# Patient Record
Sex: FEMALE | Race: OTHER | URBAN - METROPOLITAN AREA
[De-identification: names, ages, dates, MRNs, and addresses within clinical notes are randomized per-mention and may not be internally consistent; named-entity substitution may affect disease eponyms.]

---

## 2017-12-16 ENCOUNTER — INPATIENT (INPATIENT)
Facility: HOSPITAL | Age: 52
LOS: 0 days | Discharge: HOME | DRG: 419 | End: 2017-12-17
Attending: EMERGENCY MEDICINE | Admitting: EMERGENCY MEDICINE
Payer: COMMERCIAL

## 2017-12-16 ENCOUNTER — RESULT REVIEW (OUTPATIENT)
Age: 52
End: 2017-12-16

## 2017-12-16 ENCOUNTER — TRANSCRIPTION ENCOUNTER (OUTPATIENT)
Age: 52
End: 2017-12-16

## 2017-12-16 VITALS
HEART RATE: 85 BPM | RESPIRATION RATE: 20 BRPM | OXYGEN SATURATION: 99 % | TEMPERATURE: 98 F | DIASTOLIC BLOOD PRESSURE: 104 MMHG | SYSTOLIC BLOOD PRESSURE: 171 MMHG

## 2017-12-16 DIAGNOSIS — R10.9 UNSPECIFIED ABDOMINAL PAIN: ICD-10-CM

## 2017-12-16 DIAGNOSIS — Z98.891 HISTORY OF UTERINE SCAR FROM PREVIOUS SURGERY: Chronic | ICD-10-CM

## 2017-12-16 LAB
ALBUMIN SERPL ELPH-MCNC: 4.1 G/DL — SIGNIFICANT CHANGE UP (ref 3.3–5)
ALBUMIN SERPL ELPH-MCNC: 4.5 G/DL — SIGNIFICANT CHANGE UP (ref 3.3–5)
ALP SERPL-CCNC: 70 U/L — SIGNIFICANT CHANGE UP (ref 40–120)
ALP SERPL-CCNC: 75 U/L — SIGNIFICANT CHANGE UP (ref 40–120)
ALT FLD-CCNC: 81 U/L RC — HIGH (ref 10–45)
ALT FLD-CCNC: 94 U/L RC — HIGH (ref 10–45)
ANION GAP SERPL CALC-SCNC: 11 MMOL/L — SIGNIFICANT CHANGE UP (ref 5–17)
ANION GAP SERPL CALC-SCNC: 14 MMOL/L — SIGNIFICANT CHANGE UP (ref 5–17)
ANION GAP SERPL CALC-SCNC: 15 MMOL/L — SIGNIFICANT CHANGE UP (ref 5–17)
ANION GAP SERPL CALC-SCNC: 15 MMOL/L — SIGNIFICANT CHANGE UP (ref 5–17)
APPEARANCE UR: ABNORMAL
APTT BLD: 35.1 SEC — SIGNIFICANT CHANGE UP (ref 27.5–37.4)
AST SERPL-CCNC: 59 U/L — HIGH (ref 10–40)
AST SERPL-CCNC: 67 U/L — HIGH (ref 10–40)
BASOPHILS # BLD AUTO: 0.1 K/UL — SIGNIFICANT CHANGE UP (ref 0–0.2)
BASOPHILS NFR BLD AUTO: 1 % — SIGNIFICANT CHANGE UP (ref 0–2)
BILIRUB SERPL-MCNC: 0.3 MG/DL — SIGNIFICANT CHANGE UP (ref 0.2–1.2)
BILIRUB SERPL-MCNC: 0.4 MG/DL — SIGNIFICANT CHANGE UP (ref 0.2–1.2)
BILIRUB UR-MCNC: NEGATIVE — SIGNIFICANT CHANGE UP
BLD GP AB SCN SERPL QL: NEGATIVE — SIGNIFICANT CHANGE UP
BUN SERPL-MCNC: 10 MG/DL — SIGNIFICANT CHANGE UP (ref 7–23)
BUN SERPL-MCNC: 13 MG/DL — SIGNIFICANT CHANGE UP (ref 7–23)
BUN SERPL-MCNC: 9 MG/DL — SIGNIFICANT CHANGE UP (ref 7–23)
BUN SERPL-MCNC: 9 MG/DL — SIGNIFICANT CHANGE UP (ref 7–23)
CALCIUM SERPL-MCNC: 8 MG/DL — LOW (ref 8.4–10.5)
CALCIUM SERPL-MCNC: 8.2 MG/DL — LOW (ref 8.4–10.5)
CALCIUM SERPL-MCNC: 8.4 MG/DL — SIGNIFICANT CHANGE UP (ref 8.4–10.5)
CALCIUM SERPL-MCNC: 9 MG/DL — SIGNIFICANT CHANGE UP (ref 8.4–10.5)
CHLORIDE SERPL-SCNC: 101 MMOL/L — SIGNIFICANT CHANGE UP (ref 96–108)
CHLORIDE SERPL-SCNC: 101 MMOL/L — SIGNIFICANT CHANGE UP (ref 96–108)
CHLORIDE SERPL-SCNC: 103 MMOL/L — SIGNIFICANT CHANGE UP (ref 96–108)
CHLORIDE SERPL-SCNC: 103 MMOL/L — SIGNIFICANT CHANGE UP (ref 96–108)
CO2 SERPL-SCNC: 20 MMOL/L — LOW (ref 22–31)
CO2 SERPL-SCNC: 23 MMOL/L — SIGNIFICANT CHANGE UP (ref 22–31)
CO2 SERPL-SCNC: 24 MMOL/L — SIGNIFICANT CHANGE UP (ref 22–31)
CO2 SERPL-SCNC: 24 MMOL/L — SIGNIFICANT CHANGE UP (ref 22–31)
COLOR SPEC: SIGNIFICANT CHANGE UP
CREAT SERPL-MCNC: 0.43 MG/DL — LOW (ref 0.5–1.3)
CREAT SERPL-MCNC: 0.43 MG/DL — LOW (ref 0.5–1.3)
CREAT SERPL-MCNC: 0.44 MG/DL — LOW (ref 0.5–1.3)
CREAT SERPL-MCNC: 0.47 MG/DL — LOW (ref 0.5–1.3)
DIFF PNL FLD: NEGATIVE — SIGNIFICANT CHANGE UP
EOSINOPHIL # BLD AUTO: 0 K/UL — SIGNIFICANT CHANGE UP (ref 0–0.5)
EOSINOPHIL NFR BLD AUTO: 0.3 % — SIGNIFICANT CHANGE UP (ref 0–6)
GAS PNL BLDV: SIGNIFICANT CHANGE UP
GLUCOSE SERPL-MCNC: 103 MG/DL — HIGH (ref 70–99)
GLUCOSE SERPL-MCNC: 105 MG/DL — HIGH (ref 70–99)
GLUCOSE SERPL-MCNC: 134 MG/DL — HIGH (ref 70–99)
GLUCOSE SERPL-MCNC: 144 MG/DL — HIGH (ref 70–99)
GLUCOSE UR QL: NEGATIVE — SIGNIFICANT CHANGE UP
HCG SERPL-ACNC: <2 MIU/ML — LOW (ref 5–24)
HCT VFR BLD CALC: 34.1 % — LOW (ref 34.5–45)
HCT VFR BLD CALC: 37.7 % — SIGNIFICANT CHANGE UP (ref 34.5–45)
HGB BLD-MCNC: 11.6 G/DL — SIGNIFICANT CHANGE UP (ref 11.5–15.5)
HGB BLD-MCNC: 12.7 G/DL — SIGNIFICANT CHANGE UP (ref 11.5–15.5)
INR BLD: 1.08 RATIO — SIGNIFICANT CHANGE UP (ref 0.88–1.16)
KETONES UR-MCNC: NEGATIVE — SIGNIFICANT CHANGE UP
LEUKOCYTE ESTERASE UR-ACNC: ABNORMAL
LIDOCAIN IGE QN: 21 U/L — SIGNIFICANT CHANGE UP (ref 7–60)
LYMPHOCYTES # BLD AUTO: 1.7 K/UL — SIGNIFICANT CHANGE UP (ref 1–3.3)
LYMPHOCYTES # BLD AUTO: 22.3 % — SIGNIFICANT CHANGE UP (ref 13–44)
MCHC RBC-ENTMCNC: 32.2 PG — SIGNIFICANT CHANGE UP (ref 27–34)
MCHC RBC-ENTMCNC: 32.8 PG — SIGNIFICANT CHANGE UP (ref 27–34)
MCHC RBC-ENTMCNC: 33.7 GM/DL — SIGNIFICANT CHANGE UP (ref 32–36)
MCHC RBC-ENTMCNC: 34.2 GM/DL — SIGNIFICANT CHANGE UP (ref 32–36)
MCV RBC AUTO: 95.6 FL — SIGNIFICANT CHANGE UP (ref 80–100)
MCV RBC AUTO: 96.1 FL — SIGNIFICANT CHANGE UP (ref 80–100)
MONOCYTES # BLD AUTO: 0.4 K/UL — SIGNIFICANT CHANGE UP (ref 0–0.9)
MONOCYTES NFR BLD AUTO: 5.8 % — SIGNIFICANT CHANGE UP (ref 2–14)
NEUTROPHILS # BLD AUTO: 5.3 K/UL — SIGNIFICANT CHANGE UP (ref 1.8–7.4)
NEUTROPHILS NFR BLD AUTO: 70.6 % — SIGNIFICANT CHANGE UP (ref 43–77)
NITRITE UR-MCNC: NEGATIVE — SIGNIFICANT CHANGE UP
PH UR: 7 — SIGNIFICANT CHANGE UP (ref 5–8)
PLATELET # BLD AUTO: 157 K/UL — SIGNIFICANT CHANGE UP (ref 150–400)
PLATELET # BLD AUTO: 188 K/UL — SIGNIFICANT CHANGE UP (ref 150–400)
POTASSIUM SERPL-MCNC: 3.2 MMOL/L — LOW (ref 3.5–5.3)
POTASSIUM SERPL-MCNC: 3.4 MMOL/L — LOW (ref 3.5–5.3)
POTASSIUM SERPL-SCNC: 3.2 MMOL/L — LOW (ref 3.5–5.3)
POTASSIUM SERPL-SCNC: 3.4 MMOL/L — LOW (ref 3.5–5.3)
PROT SERPL-MCNC: 7.7 G/DL — SIGNIFICANT CHANGE UP (ref 6–8.3)
PROT SERPL-MCNC: 8.6 G/DL — HIGH (ref 6–8.3)
PROT UR-MCNC: 30 MG/DL
PROTHROM AB SERPL-ACNC: 11.8 SEC — SIGNIFICANT CHANGE UP (ref 9.8–12.7)
RBC # BLD: 3.55 M/UL — LOW (ref 3.8–5.2)
RBC # BLD: 3.94 M/UL — SIGNIFICANT CHANGE UP (ref 3.8–5.2)
RBC # FLD: 11.3 % — SIGNIFICANT CHANGE UP (ref 10.3–14.5)
RBC # FLD: 11.4 % — SIGNIFICANT CHANGE UP (ref 10.3–14.5)
RH IG SCN BLD-IMP: POSITIVE — SIGNIFICANT CHANGE UP
SODIUM SERPL-SCNC: 136 MMOL/L — SIGNIFICANT CHANGE UP (ref 135–145)
SODIUM SERPL-SCNC: 138 MMOL/L — SIGNIFICANT CHANGE UP (ref 135–145)
SODIUM SERPL-SCNC: 139 MMOL/L — SIGNIFICANT CHANGE UP (ref 135–145)
SODIUM SERPL-SCNC: 141 MMOL/L — SIGNIFICANT CHANGE UP (ref 135–145)
SP GR SPEC: 1.02 — SIGNIFICANT CHANGE UP (ref 1.01–1.02)
UROBILINOGEN FLD QL: NEGATIVE — SIGNIFICANT CHANGE UP
WBC # BLD: 11.2 K/UL — HIGH (ref 3.8–10.5)
WBC # BLD: 7.5 K/UL — SIGNIFICANT CHANGE UP (ref 3.8–10.5)
WBC # FLD AUTO: 11.2 K/UL — HIGH (ref 3.8–10.5)
WBC # FLD AUTO: 7.5 K/UL — SIGNIFICANT CHANGE UP (ref 3.8–10.5)

## 2017-12-16 PROCEDURE — 99285 EMERGENCY DEPT VISIT HI MDM: CPT | Mod: 25

## 2017-12-16 PROCEDURE — 71010: CPT | Mod: 26

## 2017-12-16 PROCEDURE — 99221 1ST HOSP IP/OBS SF/LOW 40: CPT | Mod: 57

## 2017-12-16 PROCEDURE — 76705 ECHO EXAM OF ABDOMEN: CPT | Mod: 26

## 2017-12-16 PROCEDURE — 47562 LAPAROSCOPIC CHOLECYSTECTOMY: CPT

## 2017-12-16 PROCEDURE — 88304 TISSUE EXAM BY PATHOLOGIST: CPT | Mod: 26

## 2017-12-16 PROCEDURE — 74020: CPT | Mod: 26

## 2017-12-16 RX ORDER — ENOXAPARIN SODIUM 100 MG/ML
40 INJECTION SUBCUTANEOUS DAILY
Refills: 0 | Status: DISCONTINUED | OUTPATIENT
Start: 2017-12-16 | End: 2017-12-16

## 2017-12-16 RX ORDER — ONDANSETRON 8 MG/1
4 TABLET, FILM COATED ORAL ONCE
Refills: 0 | Status: DISCONTINUED | OUTPATIENT
Start: 2017-12-16 | End: 2017-12-16

## 2017-12-16 RX ORDER — ACETAMINOPHEN 500 MG
1000 TABLET ORAL ONCE
Refills: 0 | Status: COMPLETED | OUTPATIENT
Start: 2017-12-16 | End: 2017-12-16

## 2017-12-16 RX ORDER — POTASSIUM CHLORIDE 20 MEQ
40 PACKET (EA) ORAL ONCE
Refills: 0 | Status: DISCONTINUED | OUTPATIENT
Start: 2017-12-16 | End: 2017-12-16

## 2017-12-16 RX ORDER — ONDANSETRON 8 MG/1
4 TABLET, FILM COATED ORAL EVERY 8 HOURS
Refills: 0 | Status: DISCONTINUED | OUTPATIENT
Start: 2017-12-16 | End: 2017-12-16

## 2017-12-16 RX ORDER — HYDROMORPHONE HYDROCHLORIDE 2 MG/ML
1 INJECTION INTRAMUSCULAR; INTRAVENOUS; SUBCUTANEOUS EVERY 4 HOURS
Refills: 0 | Status: DISCONTINUED | OUTPATIENT
Start: 2017-12-16 | End: 2017-12-16

## 2017-12-16 RX ORDER — HYDROMORPHONE HYDROCHLORIDE 2 MG/ML
0.5 INJECTION INTRAMUSCULAR; INTRAVENOUS; SUBCUTANEOUS EVERY 4 HOURS
Refills: 0 | Status: DISCONTINUED | OUTPATIENT
Start: 2017-12-16 | End: 2017-12-16

## 2017-12-16 RX ORDER — SODIUM CHLORIDE 9 MG/ML
1000 INJECTION INTRAMUSCULAR; INTRAVENOUS; SUBCUTANEOUS
Refills: 0 | Status: DISCONTINUED | OUTPATIENT
Start: 2017-12-16 | End: 2017-12-16

## 2017-12-16 RX ORDER — OXYCODONE AND ACETAMINOPHEN 5; 325 MG/1; MG/1
1 TABLET ORAL EVERY 4 HOURS
Refills: 0 | Status: DISCONTINUED | OUTPATIENT
Start: 2017-12-16 | End: 2017-12-17

## 2017-12-16 RX ORDER — POTASSIUM CHLORIDE 20 MEQ
10 PACKET (EA) ORAL ONCE
Refills: 0 | Status: COMPLETED | OUTPATIENT
Start: 2017-12-16 | End: 2017-12-16

## 2017-12-16 RX ORDER — CEFOTETAN DISODIUM 1 G
2 VIAL (EA) INJECTION ONCE
Refills: 0 | Status: COMPLETED | OUTPATIENT
Start: 2017-12-16 | End: 2017-12-16

## 2017-12-16 RX ORDER — POTASSIUM CHLORIDE 20 MEQ
10 PACKET (EA) ORAL DAILY
Refills: 0 | Status: DISCONTINUED | OUTPATIENT
Start: 2017-12-16 | End: 2017-12-16

## 2017-12-16 RX ORDER — MORPHINE SULFATE 50 MG/1
4 CAPSULE, EXTENDED RELEASE ORAL ONCE
Refills: 0 | Status: DISCONTINUED | OUTPATIENT
Start: 2017-12-16 | End: 2017-12-16

## 2017-12-16 RX ORDER — ENOXAPARIN SODIUM 100 MG/ML
40 INJECTION SUBCUTANEOUS EVERY 24 HOURS
Refills: 0 | Status: DISCONTINUED | OUTPATIENT
Start: 2017-12-16 | End: 2017-12-17

## 2017-12-16 RX ORDER — HYDROMORPHONE HYDROCHLORIDE 2 MG/ML
0.5 INJECTION INTRAMUSCULAR; INTRAVENOUS; SUBCUTANEOUS
Refills: 0 | Status: DISCONTINUED | OUTPATIENT
Start: 2017-12-16 | End: 2017-12-16

## 2017-12-16 RX ORDER — OXYCODONE AND ACETAMINOPHEN 5; 325 MG/1; MG/1
2 TABLET ORAL EVERY 6 HOURS
Refills: 0 | Status: DISCONTINUED | OUTPATIENT
Start: 2017-12-16 | End: 2017-12-17

## 2017-12-16 RX ORDER — LOSARTAN POTASSIUM 100 MG/1
25 TABLET, FILM COATED ORAL DAILY
Refills: 0 | Status: DISCONTINUED | OUTPATIENT
Start: 2017-12-16 | End: 2017-12-17

## 2017-12-16 RX ORDER — CEFOTETAN DISODIUM 1 G
2 VIAL (EA) INJECTION EVERY 12 HOURS
Refills: 0 | Status: DISCONTINUED | OUTPATIENT
Start: 2017-12-16 | End: 2017-12-16

## 2017-12-16 RX ORDER — POTASSIUM CHLORIDE 20 MEQ
40 PACKET (EA) ORAL EVERY 4 HOURS
Refills: 0 | Status: COMPLETED | OUTPATIENT
Start: 2017-12-16 | End: 2017-12-16

## 2017-12-16 RX ORDER — ACETAMINOPHEN 500 MG
1000 TABLET ORAL ONCE
Refills: 0 | Status: DISCONTINUED | OUTPATIENT
Start: 2017-12-16 | End: 2017-12-17

## 2017-12-16 RX ORDER — SODIUM CHLORIDE 9 MG/ML
1000 INJECTION INTRAMUSCULAR; INTRAVENOUS; SUBCUTANEOUS ONCE
Refills: 0 | Status: COMPLETED | OUTPATIENT
Start: 2017-12-16 | End: 2017-12-16

## 2017-12-16 RX ORDER — LOSARTAN POTASSIUM 100 MG/1
25 TABLET, FILM COATED ORAL DAILY
Refills: 0 | Status: DISCONTINUED | OUTPATIENT
Start: 2017-12-16 | End: 2017-12-16

## 2017-12-16 RX ORDER — SODIUM CHLORIDE 9 MG/ML
1000 INJECTION, SOLUTION INTRAVENOUS
Refills: 0 | Status: DISCONTINUED | OUTPATIENT
Start: 2017-12-16 | End: 2017-12-17

## 2017-12-16 RX ADMIN — Medication 100 MILLIEQUIVALENT(S): at 21:53

## 2017-12-16 RX ADMIN — Medication 100 MILLIEQUIVALENT(S): at 23:13

## 2017-12-16 RX ADMIN — SODIUM CHLORIDE 1000 MILLILITER(S): 9 INJECTION INTRAMUSCULAR; INTRAVENOUS; SUBCUTANEOUS at 04:58

## 2017-12-16 RX ADMIN — HYDROMORPHONE HYDROCHLORIDE 0.5 MILLIGRAM(S): 2 INJECTION INTRAMUSCULAR; INTRAVENOUS; SUBCUTANEOUS at 12:32

## 2017-12-16 RX ADMIN — ONDANSETRON 4 MILLIGRAM(S): 8 TABLET, FILM COATED ORAL at 17:10

## 2017-12-16 RX ADMIN — Medication 1000 MILLIGRAM(S): at 05:50

## 2017-12-16 RX ADMIN — Medication 400 MILLIGRAM(S): at 05:20

## 2017-12-16 RX ADMIN — HYDROMORPHONE HYDROCHLORIDE 0.5 MILLIGRAM(S): 2 INJECTION INTRAMUSCULAR; INTRAVENOUS; SUBCUTANEOUS at 13:02

## 2017-12-16 RX ADMIN — SODIUM CHLORIDE 75 MILLILITER(S): 9 INJECTION, SOLUTION INTRAVENOUS at 21:53

## 2017-12-16 RX ADMIN — Medication 40 MILLIEQUIVALENT(S): at 17:52

## 2017-12-16 RX ADMIN — ONDANSETRON 4 MILLIGRAM(S): 8 TABLET, FILM COATED ORAL at 09:42

## 2017-12-16 RX ADMIN — Medication 100 GRAM(S): at 17:10

## 2017-12-16 RX ADMIN — Medication 100 GRAM(S): at 09:42

## 2017-12-16 RX ADMIN — MORPHINE SULFATE 4 MILLIGRAM(S): 50 CAPSULE, EXTENDED RELEASE ORAL at 04:58

## 2017-12-16 RX ADMIN — ENOXAPARIN SODIUM 40 MILLIGRAM(S): 100 INJECTION SUBCUTANEOUS at 11:33

## 2017-12-16 NOTE — H&P ADULT - NSHPPHYSICALEXAM_GEN_ALL_CORE
Gen: WD, WN, NAD  HEENT: PERRLA, EOMI, Oropharynx clear  Neck: Supple, no JVD/Bruit, No thyromegaly  Lungs: CTA B/L  Heart: RRR, S1 S2, No m/r/g  Abd: Soft, ND, Mild TTP RUQ, No HSM, No rebound or guarding, Negative Rosado's sign (Pt recently received morphine and IV tylenol)  Ext: WWP, No clubbing, cyanosis, or edema  Neuro: AAOx3, CN II-XII grossly intact, No focal deficits

## 2017-12-16 NOTE — H&P ADULT - NSHPLABSRESULTS_GEN_ALL_CORE
LABS:  CBC (12-16 @ 04:58)                              12.7                           7.5     )----------------(  188        70.6  % Neutrophils, 22.3  % Lymphocytes, ANC: 5.3                                 37.7                  BMP (12-16 @ 04:58)             139     |  101     |  13    		Ca++ --      Ca 9.0                ---------------------------------( 134<H>		Mg --                 3.4<L>  |  23      |  0.47<L>			Ph --        LFTs (12-16 @ 04:58)      TPro 8.6<H> / Alb 4.5 / TBili 0.3 / DBili -- / AST 67<H> / ALT 94<H> / AlkPhos 75      ABG (12-16 @ 04:58)      /  /  /  /  / %     Lactate:   1.9    VBG (12-16 @ 04:58)     7.41 / 42 / 27 / 26 / 1.7 / 44<L>%      IMAGING:  < from: US Abdomen Limited (12.16.17 @ 08:08) >    IMPRESSION:     Cholelithiasis and minimally thickened gallbladder wall. Findings may   represent acute cholecystitis. Further evaluation with HIDA scan is   recommended.    < end of copied text >

## 2017-12-16 NOTE — ED ADULT NURSE REASSESSMENT NOTE - NS ED NURSE REASSESS COMMENT FT1
Received report this AM from Maki LAIRD. PT observed resting comfortably in bed. Pt denies any needs at this time. Awaiting ultrasound. Plan of care reviewed with pt. Safety and comfort maintained.

## 2017-12-16 NOTE — ED PROVIDER NOTE - MEDICAL DECISION MAKING DETAILS
Jenna SWANSON: 53 y/o female with hx of HTN here with acute onset of abd pain. Patient reports she developed acute onset of severe epigastric abd pain associated with nausea. Patient denies vomiting, diarrhea, melena, BRBPR, similar symptoms in the past and abd surgeries. Exam shows a female in mild distress with clear lungs and no CVA tenderness and + [place] sign. Patient with normal skin. No abd rebound or guarding. Consider Biliary disease vs Pancreatitis vs Gastritis vs GERD vs Lower lobe PNA. Plan CBC, CMP, Lipase, US, UA, Pain control and reassess.

## 2017-12-16 NOTE — H&P ADULT - HISTORY OF PRESENT ILLNESS
52yF w/ PMH sig for HTN on losartan s/p  in . Pt presented to [facility] ED on 2017 c/o 12 hours of severe epigastric and RUQ pain after a spicy meal last night. She has never experienced pain like this before. She does state she has been having some epigastric pain after meals for the past few months, but thought it was acid reflux. She also had 1 episode of NBNB emesis last night. She continues to complain of nausea this AM. She denies F/C, CP/SoB/Palpitations, diarrhea/constipation, dysuria/hematuria.

## 2017-12-16 NOTE — ED ADULT NURSE REASSESSMENT NOTE - NS ED NURSE REASSESS COMMENT FT1
Received report this AM from Maki LAIRD. PT observed resting comfortably in bed. Pt denies any needs at this time. Awaiting imaging and UA. Plan of care reviewed with pt. Safety and comfort maintained. Received report this AM from Maki LAIRD. PT observed resting comfortably in bed. Pt denies any needs at this time. Awaiting ultrasound. Plan of care reviewed with pt. Safety and comfort maintained.

## 2017-12-16 NOTE — BRIEF OPERATIVE NOTE - PROCEDURE
<<-----Click on this checkbox to enter Procedure Laparoscopic cholecystectomy  12/16/2017    Active  [id number]

## 2017-12-16 NOTE — ED PROCEDURE NOTE - PROCEDURE ADDITIONAL DETAILS
POCUS of RUQ done and Gallbladder found to have a midlly swollen anterior wall and CBD found to be within normal dimensions. Stone noted in neck. No pericolecystic fluid noted.

## 2017-12-16 NOTE — ED PROVIDER NOTE - OBJECTIVE STATEMENT
Jenna SWANSON: 51 y/o female with hx of HTN here with acute onset of abd pain. Patient reports she developed acute onset of severe epigastric abd pain associated with nausea. Patient denies vomiting, diarrhea, melena, BRBPR, similar symptoms in the past and abd surgeries. Exam shows a female in mild distress with clear lungs and no CVA tenderness and + [place] sign. Patient with normal skin. No abd rebound or guarding. Consider Biliary disease vs Pancreatitis vs Gastritis vs GERD vs Lower lobe PNA. Plan CBC, CMP, Lipase, US, UA, Pain control and reassess.

## 2017-12-16 NOTE — ED ADULT NURSE NOTE - OBJECTIVE STATEMENT
52y female with hx of htn presents to the ER c/o epigastric pain. pt is alert and oriented x 4 and speaking coherently. pt states around 8pm last night she had a gradually intensifying pain. pt describes the pain as burning and is so severe she vomited from the pain. pt states the pain is lessened by pressing on the abdomen. pt denies taking any medications for the pain before coming. pt appears uncomfortable at the bedside and is guarding abdomen. pt denies cp, sob, fevers, diarrhea. MD santos completed. family at the bedside. will reassess.

## 2017-12-16 NOTE — ED ADULT NURSE REASSESSMENT NOTE - COMFORT CARE
plan of care explained/side rails up/warm blanket provided darkened lights/plan of care explained/side rails up/warm blanket provided

## 2017-12-16 NOTE — H&P ADULT - ASSESSMENT
52yF w/ early acute cholecystitis    - Admit to ATP, Dr. Allison  - NPI/IVF  - Cefotetan 2g IV BID  - Repeat LFTs (mildly elevated transaminases)  - Pt discussed w/ Dr. Allison  - Please page [phone] w/ any questions    DEVIKA Bell PGY-2 52yF w/ early acute cholecystitis    - Admit to ATP, Dr. Allison  - NPO/IVF  - Cefotetan 2g IV BID  - Repeat LFTs (mildly elevated transaminases)  - Pt discussed w/ Dr. Allison  - Please page [phone] w/ any questions    DEVIKA Bell PGY-2

## 2017-12-16 NOTE — H&P ADULT - ATTENDING COMMENTS
Pt seen and examined. Agree with A/P. Admit to ATP, floor. NPO, IVF, abx. Taken to OR for lap dora, tolerated procedure well. Will advance diet, pain control, sqh, likely d/c home tomorrow.

## 2017-12-16 NOTE — ED PROVIDER NOTE - PROGRESS NOTE DETAILS
Maki RN notified to me that patient with change in abdominal pain, patient reevaluated she states pain feels different. on reevaluation she appears slightly more uncomfortable, abdomen still soft without rebound or guarding, tender epigastric, given change in pain need to r/o perf, obtain xray chest and abdomen series, xray called to take patient next.

## 2017-12-17 ENCOUNTER — TRANSCRIPTION ENCOUNTER (OUTPATIENT)
Age: 52
End: 2017-12-17

## 2017-12-17 VITALS
OXYGEN SATURATION: 95 % | DIASTOLIC BLOOD PRESSURE: 71 MMHG | RESPIRATION RATE: 17 BRPM | HEART RATE: 81 BPM | TEMPERATURE: 99 F | SYSTOLIC BLOOD PRESSURE: 111 MMHG

## 2017-12-17 LAB
ALBUMIN SERPL ELPH-MCNC: 3.9 G/DL — SIGNIFICANT CHANGE UP (ref 3.3–5)
ALP SERPL-CCNC: 67 U/L — SIGNIFICANT CHANGE UP (ref 40–120)
ALT FLD-CCNC: 76 U/L RC — HIGH (ref 10–45)
ANION GAP SERPL CALC-SCNC: 13 MMOL/L — SIGNIFICANT CHANGE UP (ref 5–17)
AST SERPL-CCNC: 54 U/L — HIGH (ref 10–40)
BILIRUB SERPL-MCNC: 0.4 MG/DL — SIGNIFICANT CHANGE UP (ref 0.2–1.2)
BUN SERPL-MCNC: 9 MG/DL — SIGNIFICANT CHANGE UP (ref 7–23)
CALCIUM SERPL-MCNC: 8.7 MG/DL — SIGNIFICANT CHANGE UP (ref 8.4–10.5)
CHLORIDE SERPL-SCNC: 106 MMOL/L — SIGNIFICANT CHANGE UP (ref 96–108)
CO2 SERPL-SCNC: 22 MMOL/L — SIGNIFICANT CHANGE UP (ref 22–31)
CREAT SERPL-MCNC: 0.47 MG/DL — LOW (ref 0.5–1.3)
GLUCOSE SERPL-MCNC: 117 MG/DL — HIGH (ref 70–99)
HCT VFR BLD CALC: 36 % — SIGNIFICANT CHANGE UP (ref 34.5–45)
HGB BLD-MCNC: 12.2 G/DL — SIGNIFICANT CHANGE UP (ref 11.5–15.5)
MCHC RBC-ENTMCNC: 32.2 PG — SIGNIFICANT CHANGE UP (ref 27–34)
MCHC RBC-ENTMCNC: 33.8 GM/DL — SIGNIFICANT CHANGE UP (ref 32–36)
MCV RBC AUTO: 95.1 FL — SIGNIFICANT CHANGE UP (ref 80–100)
PLATELET # BLD AUTO: 189 K/UL — SIGNIFICANT CHANGE UP (ref 150–400)
POTASSIUM SERPL-MCNC: 3.9 MMOL/L — SIGNIFICANT CHANGE UP (ref 3.5–5.3)
POTASSIUM SERPL-SCNC: 3.9 MMOL/L — SIGNIFICANT CHANGE UP (ref 3.5–5.3)
PROT SERPL-MCNC: 7.7 G/DL — SIGNIFICANT CHANGE UP (ref 6–8.3)
RBC # BLD: 3.79 M/UL — LOW (ref 3.8–5.2)
RBC # FLD: 11.4 % — SIGNIFICANT CHANGE UP (ref 10.3–14.5)
SODIUM SERPL-SCNC: 141 MMOL/L — SIGNIFICANT CHANGE UP (ref 135–145)
WBC # BLD: 6.4 K/UL — SIGNIFICANT CHANGE UP (ref 3.8–10.5)
WBC # FLD AUTO: 6.4 K/UL — SIGNIFICANT CHANGE UP (ref 3.8–10.5)

## 2017-12-17 PROCEDURE — 86900 BLOOD TYPING SEROLOGIC ABO: CPT

## 2017-12-17 PROCEDURE — 85610 PROTHROMBIN TIME: CPT

## 2017-12-17 PROCEDURE — 86850 RBC ANTIBODY SCREEN: CPT

## 2017-12-17 PROCEDURE — 85730 THROMBOPLASTIN TIME PARTIAL: CPT

## 2017-12-17 PROCEDURE — 99285 EMERGENCY DEPT VISIT HI MDM: CPT | Mod: 25

## 2017-12-17 PROCEDURE — 76705 ECHO EXAM OF ABDOMEN: CPT

## 2017-12-17 PROCEDURE — 84295 ASSAY OF SERUM SODIUM: CPT

## 2017-12-17 PROCEDURE — 85014 HEMATOCRIT: CPT

## 2017-12-17 PROCEDURE — 96374 THER/PROPH/DIAG INJ IV PUSH: CPT

## 2017-12-17 PROCEDURE — 74020: CPT

## 2017-12-17 PROCEDURE — 96375 TX/PRO/DX INJ NEW DRUG ADDON: CPT

## 2017-12-17 PROCEDURE — 88304 TISSUE EXAM BY PATHOLOGIST: CPT

## 2017-12-17 PROCEDURE — 83690 ASSAY OF LIPASE: CPT

## 2017-12-17 PROCEDURE — 82435 ASSAY OF BLOOD CHLORIDE: CPT

## 2017-12-17 PROCEDURE — 80053 COMPREHEN METABOLIC PANEL: CPT

## 2017-12-17 PROCEDURE — 82947 ASSAY GLUCOSE BLOOD QUANT: CPT

## 2017-12-17 PROCEDURE — 85027 COMPLETE CBC AUTOMATED: CPT

## 2017-12-17 PROCEDURE — 82803 BLOOD GASES ANY COMBINATION: CPT

## 2017-12-17 PROCEDURE — 81001 URINALYSIS AUTO W/SCOPE: CPT

## 2017-12-17 PROCEDURE — 83605 ASSAY OF LACTIC ACID: CPT

## 2017-12-17 PROCEDURE — 80048 BASIC METABOLIC PNL TOTAL CA: CPT

## 2017-12-17 PROCEDURE — 84702 CHORIONIC GONADOTROPIN TEST: CPT

## 2017-12-17 PROCEDURE — 82330 ASSAY OF CALCIUM: CPT

## 2017-12-17 PROCEDURE — 86901 BLOOD TYPING SEROLOGIC RH(D): CPT

## 2017-12-17 PROCEDURE — 84132 ASSAY OF SERUM POTASSIUM: CPT

## 2017-12-17 PROCEDURE — 71045 X-RAY EXAM CHEST 1 VIEW: CPT

## 2017-12-17 RX ORDER — OXYCODONE AND ACETAMINOPHEN 5; 325 MG/1; MG/1
1 TABLET ORAL
Qty: 24 | Refills: 0
Start: 2017-12-17 | End: 2017-12-20

## 2017-12-17 RX ORDER — SODIUM CHLORIDE 9 MG/ML
3 INJECTION INTRAMUSCULAR; INTRAVENOUS; SUBCUTANEOUS EVERY 8 HOURS
Refills: 0 | Status: DISCONTINUED | OUTPATIENT
Start: 2017-12-17 | End: 2017-12-17

## 2017-12-17 RX ORDER — ONDANSETRON 8 MG/1
4 TABLET, FILM COATED ORAL ONCE
Refills: 0 | Status: COMPLETED | OUTPATIENT
Start: 2017-12-17 | End: 2017-12-17

## 2017-12-17 RX ADMIN — SODIUM CHLORIDE 3 MILLILITER(S): 9 INJECTION INTRAMUSCULAR; INTRAVENOUS; SUBCUTANEOUS at 14:00

## 2017-12-17 RX ADMIN — ONDANSETRON 4 MILLIGRAM(S): 8 TABLET, FILM COATED ORAL at 12:26

## 2017-12-17 RX ADMIN — ENOXAPARIN SODIUM 40 MILLIGRAM(S): 100 INJECTION SUBCUTANEOUS at 05:57

## 2017-12-17 RX ADMIN — OXYCODONE AND ACETAMINOPHEN 2 TABLET(S): 5; 325 TABLET ORAL at 08:23

## 2017-12-17 RX ADMIN — OXYCODONE AND ACETAMINOPHEN 2 TABLET(S): 5; 325 TABLET ORAL at 08:53

## 2017-12-17 RX ADMIN — LOSARTAN POTASSIUM 25 MILLIGRAM(S): 100 TABLET, FILM COATED ORAL at 05:52

## 2017-12-17 NOTE — DISCHARGE NOTE ADULT - CARE PROVIDER_API CALL
Magdaleno Alilson), Surgery  [address] [place] [address]  Phone: [phone]  Fax: [phone]

## 2017-12-17 NOTE — DISCHARGE NOTE ADULT - PLAN OF CARE
Post-operative recovery, resolution of pain No heavy lifting for 4-6 weeks to allow tissue to heal. Recommend low fat diet after removal of your gallbladder.    Follow up with Dr. Allison in the office in 10-14 days. Call the office to make an appointment.

## 2017-12-17 NOTE — PROGRESS NOTE ADULT - SUBJECTIVE AND OBJECTIVE BOX
STATUS POST:  Laparoscopic cholecystectomy    POST OPERATIVE DAY #: 0    SUBJECTIVE: Pt seen and examined. Tolerated procedure well. Has no complaints.     Vital Signs Last 24 Hrs  T(C): 37.1 (17 Dec 2017 00:48), Max: 37.1 (17 Dec 2017 00:48)  T(F): 98.8 (17 Dec 2017 00:48), Max: 98.8 (17 Dec 2017 00:48)  HR: 74 (17 Dec 2017 00:48) (72 - 96)  BP: 134/79 (17 Dec 2017 00:48) (125/70 - 171/104)  BP(mean): 104 (16 Dec 2017 22:45) (89 - 108)  RR: 18 (17 Dec 2017 00:48) (12 - 20)  SpO2: 95% (17 Dec 2017 00:48) (95% - 100%)  I&O's Summary    16 Dec 2017 07:  -  17 Dec 2017 01:15  --------------------------------------------------------  IN: 1350 mL / OUT: 1000 mL / NET: 350 mL      I&O's Detail    16 Dec 2017 07:  -  17 Dec 2017 01:15  --------------------------------------------------------  IN:    IV PiggyBack: 250 mL    lactated ringers.: 300 mL    sodium chloride 0.9%: 800 mL  Total IN: 1350 mL    OUT:    Voided: 1000 mL  Total OUT: 1000 mL    Total NET: 350 mL          MEDICATIONS  (STANDING):  acetaminophen  IVPB. 1000 milliGRAM(s) IV Intermittent once  enoxaparin Injectable 40 milliGRAM(s) SubCutaneous every 24 hours  lactated ringers. 1000 milliLiter(s) (75 mL/Hr) IV Continuous <Continuous>  losartan 25 milliGRAM(s) Oral daily    MEDICATIONS  (PRN):  oxyCODONE    5 mG/acetaminophen 325 mG 1 Tablet(s) Oral every 4 hours PRN Moderate Pain  oxyCODONE    5 mG/acetaminophen 325 mG 2 Tablet(s) Oral every 6 hours PRN Severe Pain      LABS:                        11.6   11.2  )-----------( 157      ( 16 Dec 2017 21:03 )             34.1         136  |  101  |  9   ----------------------------<  144<H>  3.2<L>   |  20<L>  |  0.43<L>    Ca    8.0<L>      16 Dec 2017 21:03    TPro  7.7  /  Alb  4.1  /  TBili  0.4  /  DBili  x   /  AST  59<H>  /  ALT  81<H>  /  AlkPhos  70      PT/INR - ( 16 Dec 2017 16:57 )   PT: 11.8 sec;   INR: 1.08 ratio         PTT - ( 16 Dec 2017 16:57 )  PTT:35.1 sec  Urinalysis Basic - ( 16 Dec 2017 06:47 )    Color: PL Yellow / Appearance: Turbid / S.020 / pH: x  Gluc: x / Ketone: Negative  / Bili: Negative / Urobili: Negative   Blood: x / Protein: 30 mg/dL / Nitrite: Negative   Leuk Esterase: Trace / RBC: x / WBC 5-10 /HPF   Sq Epi: x / Non Sq Epi: x / Bacteria: Moderate /HPF        RADIOLOGY & ADDITIONAL STUDIES:    PHYSICAL EXAM:  Gen: awake in bed, in NAD  Resp: SpO2 100% on RA  Abd: soft, non-distended, mildly ttp, incisions c/d/i  Ext: WWP

## 2017-12-17 NOTE — DISCHARGE NOTE ADULT - MEDICATION SUMMARY - MEDICATIONS TO TAKE
I will START or STAY ON the medications listed below when I get home from the hospital:    Percocet 5/325 oral tablet  -- 1 tab(s) by mouth every 4 to 6 hours, As Needed -for moderate pain - for severe pain MDD:6 tablets   -- Caution federal law prohibits the transfer of this drug to any person other  than the person for whom it was prescribed.  May cause drowsiness.  Alcohol may intensify this effect.  Use care when operating dangerous machinery.  This prescription cannot be refilled.  This product contains acetaminophen.  Do not use  with any other product containing acetaminophen to prevent possible liver damage.  Using more of this medication than prescribed may cause serious breathing problems.    -- Indication: For Pain Medication for Post-Surgical Pain    losartan 25 mg oral tablet  -- 1 tab(s) by mouth once a day  -- Indication: For Home medication (for Hypertension)

## 2017-12-17 NOTE — DISCHARGE NOTE ADULT - CARE PROVIDERS DIRECT ADDRESSES
[email or web address]

## 2017-12-17 NOTE — DISCHARGE NOTE ADULT - ADDITIONAL INSTRUCTIONS
Please call Dr. Magdaleno Allison at [phone] to schedule a follow-up appointment in 1-2 weeks after discharge.

## 2017-12-17 NOTE — PROGRESS NOTE ADULT - ASSESSMENT
52F with acute cholecystitis s/p laparoscopic cholecystectomy.     Plan:  - Regular diet  - Pain control  - DVT PPx Lovenox  - AM labs  - Discharge home in AM

## 2017-12-17 NOTE — DISCHARGE NOTE ADULT - HOSPITAL COURSE
52yF w/ PMH sig for HTN on losartan s/p  in . Pt presented to [facility] ED on 2017 c/o 12 hours of severe epigastric and RUQ pain after a spicy meal last night. She has never experienced pain like this before. She does state she has been having some epigastric pain after meals for the past few months, but thought it was acid reflux. She also had 1 episode of NB/NB emesis last night. She continues to complain of nausea this AM. She denies F/C, CP/SoB/Palpitations, diarrhea/constipation, dysuria/hematuria.    U/S Abdomen:   IMPRESSION:     Cholelithiasis and minimally thickened gallbladder wall. Findings may   represent acute cholecystitis. Further evaluation with HIDA scan is   recommended.    Diagnosed with acute cholecystitis. Taken to the OR for laparoscopic cholecystectomy, tolerated procedure well.  Diet was advanced to regular as tolerated. Pain well controlled. Discharge home today with outpatient follow-up. 52yF w/ PMH sig for HTN on losartan s/p  in . Pt presented to [facility] ED on 2017 c/o 12 hours of severe epigastric and RUQ pain after a spicy meal last night. She has never experienced pain like this before. She does state she has been having some epigastric pain after meals for the past few months, but thought it was acid reflux. She also had 1 episode of NB/NB emesis last night. She continues to complain of nausea this AM. She denies F/C, CP/SoB/Palpitations, diarrhea/constipation, dysuria/hematuria.    U/S Abdomen:   IMPRESSION:     Cholelithiasis and minimally thickened gallbladder wall. Findings may   represent acute cholecystitis. Further evaluation with HIDA scan is   recommended.    Diagnosed with acute cholecystitis. Taken to the OR for laparoscopic cholecystectomy, tolerated procedure well.  Diet was advanced to regular as tolerated. Pain well controlled. Discharge home today with outpatient follow-up instructions and prescription for percocet (dose: 5/325, every 4-6 hours as needed for 4 days)    Do not drive while on pain medication (Percocet).  It is okay for the patient to shower at home tonight, can remove tape/gauze tonight, and leave steri-strips intact; they will fall off on their own.

## 2017-12-17 NOTE — DISCHARGE NOTE ADULT - CARE PLAN
Principal Discharge DX:	Cholecystitis, acute  Goal:	Post-operative recovery, resolution of pain  Instructions for follow-up, activity and diet:	No heavy lifting for 4-6 weeks to allow tissue to heal. Recommend low fat diet after removal of your gallbladder.    Follow up with Dr. Allison in the office in 10-14 days. Call the office to make an appointment.

## 2017-12-17 NOTE — DISCHARGE NOTE ADULT - PATIENT PORTAL LINK FT
“You can access the [employer] Patient Portal, offered by [facility], by registering with the following website: [email or web address]”

## 2017-12-21 LAB — SURGICAL PATHOLOGY STUDY: SIGNIFICANT CHANGE UP

## 2018-01-03 PROBLEM — Z00.00 ENCOUNTER FOR PREVENTIVE HEALTH EXAMINATION: Status: ACTIVE | Noted: 2018-01-03

## 2018-01-04 ENCOUNTER — APPOINTMENT (OUTPATIENT)
Dept: TRAUMA SURGERY | Facility: CLINIC | Age: 53
End: 2018-01-04

## 2018-01-18 ENCOUNTER — APPOINTMENT (OUTPATIENT)
Dept: TRAUMA SURGERY | Facility: CLINIC | Age: 53
End: 2018-01-18
Payer: COMMERCIAL

## 2018-01-18 VITALS
TEMPERATURE: 97.7 F | DIASTOLIC BLOOD PRESSURE: 107 MMHG | BODY MASS INDEX: 22.82 KG/M2 | SYSTOLIC BLOOD PRESSURE: 167 MMHG | WEIGHT: 124 LBS | HEART RATE: 91 BPM | HEIGHT: 62 IN

## 2018-01-18 DIAGNOSIS — K81.0 ACUTE CHOLECYSTITIS: ICD-10-CM

## 2018-01-18 PROCEDURE — 99024 POSTOP FOLLOW-UP VISIT: CPT

## 2018-02-17 ENCOUNTER — EMERGENCY (EMERGENCY)
Facility: HOSPITAL | Age: 53
LOS: 1 days | Discharge: HOME | End: 2018-02-17
Attending: EMERGENCY MEDICINE | Admitting: EMERGENCY MEDICINE
Payer: COMMERCIAL

## 2018-02-17 VITALS
TEMPERATURE: 98 F | HEART RATE: 82 BPM | SYSTOLIC BLOOD PRESSURE: 176 MMHG | RESPIRATION RATE: 18 BRPM | DIASTOLIC BLOOD PRESSURE: 98 MMHG | OXYGEN SATURATION: 100 %

## 2018-02-17 VITALS
DIASTOLIC BLOOD PRESSURE: 123 MMHG | HEART RATE: 89 BPM | OXYGEN SATURATION: 99 % | RESPIRATION RATE: 18 BRPM | TEMPERATURE: 97 F | SYSTOLIC BLOOD PRESSURE: 223 MMHG

## 2018-02-17 DIAGNOSIS — Z98.890 OTHER SPECIFIED POSTPROCEDURAL STATES: Chronic | ICD-10-CM

## 2018-02-17 DIAGNOSIS — Z98.891 HISTORY OF UTERINE SCAR FROM PREVIOUS SURGERY: Chronic | ICD-10-CM

## 2018-02-17 LAB
ALBUMIN SERPL ELPH-MCNC: 4.7 G/DL — SIGNIFICANT CHANGE UP (ref 3.3–5)
ALP SERPL-CCNC: 69 U/L — SIGNIFICANT CHANGE UP (ref 40–120)
ALT FLD-CCNC: 67 U/L RC — HIGH (ref 10–45)
ANION GAP SERPL CALC-SCNC: 13 MMOL/L — SIGNIFICANT CHANGE UP (ref 5–17)
APPEARANCE UR: CLEAR — SIGNIFICANT CHANGE UP
AST SERPL-CCNC: 67 U/L — HIGH (ref 10–40)
BASOPHILS # BLD AUTO: 0.1 K/UL — SIGNIFICANT CHANGE UP (ref 0–0.2)
BASOPHILS NFR BLD AUTO: 1.7 % — SIGNIFICANT CHANGE UP (ref 0–2)
BILIRUB SERPL-MCNC: 0.3 MG/DL — SIGNIFICANT CHANGE UP (ref 0.2–1.2)
BILIRUB UR-MCNC: NEGATIVE — SIGNIFICANT CHANGE UP
BUN SERPL-MCNC: 12 MG/DL — SIGNIFICANT CHANGE UP (ref 7–23)
CALCIUM SERPL-MCNC: 9.5 MG/DL — SIGNIFICANT CHANGE UP (ref 8.4–10.5)
CHLORIDE SERPL-SCNC: 104 MMOL/L — SIGNIFICANT CHANGE UP (ref 96–108)
CO2 SERPL-SCNC: 25 MMOL/L — SIGNIFICANT CHANGE UP (ref 22–31)
COLOR SPEC: COLORLESS — SIGNIFICANT CHANGE UP
COMMENT - URINE: SIGNIFICANT CHANGE UP
CREAT SERPL-MCNC: 0.55 MG/DL — SIGNIFICANT CHANGE UP (ref 0.5–1.3)
DIFF PNL FLD: NEGATIVE — SIGNIFICANT CHANGE UP
EOSINOPHIL # BLD AUTO: 0.1 K/UL — SIGNIFICANT CHANGE UP (ref 0–0.5)
EOSINOPHIL NFR BLD AUTO: 2 % — SIGNIFICANT CHANGE UP (ref 0–6)
EPI CELLS # UR: SIGNIFICANT CHANGE UP /HPF
GLUCOSE SERPL-MCNC: 124 MG/DL — HIGH (ref 70–99)
GLUCOSE UR QL: NEGATIVE — SIGNIFICANT CHANGE UP
HCT VFR BLD CALC: 36.8 % — SIGNIFICANT CHANGE UP (ref 34.5–45)
HGB BLD-MCNC: 12.1 G/DL — SIGNIFICANT CHANGE UP (ref 11.5–15.5)
KETONES UR-MCNC: NEGATIVE — SIGNIFICANT CHANGE UP
LEUKOCYTE ESTERASE UR-ACNC: NEGATIVE — SIGNIFICANT CHANGE UP
LYMPHOCYTES # BLD AUTO: 3 K/UL — SIGNIFICANT CHANGE UP (ref 1–3.3)
LYMPHOCYTES # BLD AUTO: 53.9 % — HIGH (ref 13–44)
MCHC RBC-ENTMCNC: 30.9 PG — SIGNIFICANT CHANGE UP (ref 27–34)
MCHC RBC-ENTMCNC: 33 GM/DL — SIGNIFICANT CHANGE UP (ref 32–36)
MCV RBC AUTO: 93.8 FL — SIGNIFICANT CHANGE UP (ref 80–100)
MONOCYTES # BLD AUTO: 0.4 K/UL — SIGNIFICANT CHANGE UP (ref 0–0.9)
MONOCYTES NFR BLD AUTO: 7.6 % — SIGNIFICANT CHANGE UP (ref 2–14)
NEUTROPHILS # BLD AUTO: 2 K/UL — SIGNIFICANT CHANGE UP (ref 1.8–7.4)
NEUTROPHILS NFR BLD AUTO: 34.8 % — LOW (ref 43–77)
NITRITE UR-MCNC: NEGATIVE — SIGNIFICANT CHANGE UP
PH UR: 7.5 — SIGNIFICANT CHANGE UP (ref 5–8)
PLATELET # BLD AUTO: 213 K/UL — SIGNIFICANT CHANGE UP (ref 150–400)
POTASSIUM SERPL-MCNC: 3.3 MMOL/L — LOW (ref 3.5–5.3)
POTASSIUM SERPL-SCNC: 3.3 MMOL/L — LOW (ref 3.5–5.3)
PROT SERPL-MCNC: 8.9 G/DL — HIGH (ref 6–8.3)
PROT UR-MCNC: SIGNIFICANT CHANGE UP
RBC # BLD: 3.93 M/UL — SIGNIFICANT CHANGE UP (ref 3.8–5.2)
RBC # FLD: 11.8 % — SIGNIFICANT CHANGE UP (ref 10.3–14.5)
RBC CASTS # UR COMP ASSIST: SIGNIFICANT CHANGE UP /HPF (ref 0–2)
SODIUM SERPL-SCNC: 142 MMOL/L — SIGNIFICANT CHANGE UP (ref 135–145)
SP GR SPEC: 1.01 — SIGNIFICANT CHANGE UP (ref 1.01–1.02)
UROBILINOGEN FLD QL: NEGATIVE — SIGNIFICANT CHANGE UP
WBC # BLD: 5.7 K/UL — SIGNIFICANT CHANGE UP (ref 3.8–10.5)
WBC # FLD AUTO: 5.7 K/UL — SIGNIFICANT CHANGE UP (ref 3.8–10.5)
WBC UR QL: SIGNIFICANT CHANGE UP /HPF (ref 0–5)

## 2018-02-17 PROCEDURE — 99285 EMERGENCY DEPT VISIT HI MDM: CPT | Mod: 25

## 2018-02-17 PROCEDURE — 96375 TX/PRO/DX INJ NEW DRUG ADDON: CPT

## 2018-02-17 PROCEDURE — 96374 THER/PROPH/DIAG INJ IV PUSH: CPT | Mod: XU

## 2018-02-17 PROCEDURE — 74177 CT ABD & PELVIS W/CONTRAST: CPT | Mod: 26

## 2018-02-17 PROCEDURE — 99284 EMERGENCY DEPT VISIT MOD MDM: CPT | Mod: 25

## 2018-02-17 PROCEDURE — 87086 URINE CULTURE/COLONY COUNT: CPT

## 2018-02-17 PROCEDURE — 96376 TX/PRO/DX INJ SAME DRUG ADON: CPT

## 2018-02-17 PROCEDURE — 74177 CT ABD & PELVIS W/CONTRAST: CPT

## 2018-02-17 PROCEDURE — 81001 URINALYSIS AUTO W/SCOPE: CPT

## 2018-02-17 PROCEDURE — 85027 COMPLETE CBC AUTOMATED: CPT

## 2018-02-17 PROCEDURE — 80053 COMPREHEN METABOLIC PANEL: CPT

## 2018-02-17 RX ORDER — KETOROLAC TROMETHAMINE 30 MG/ML
15 SYRINGE (ML) INJECTION ONCE
Refills: 0 | Status: DISCONTINUED | OUTPATIENT
Start: 2018-02-17 | End: 2018-02-17

## 2018-02-17 RX ORDER — ONDANSETRON 8 MG/1
4 TABLET, FILM COATED ORAL ONCE
Refills: 0 | Status: COMPLETED | OUTPATIENT
Start: 2018-02-17 | End: 2018-02-17

## 2018-02-17 RX ORDER — TAMSULOSIN HYDROCHLORIDE 0.4 MG/1
0.4 CAPSULE ORAL ONCE
Refills: 0 | Status: COMPLETED | OUTPATIENT
Start: 2018-02-17 | End: 2018-02-17

## 2018-02-17 RX ORDER — SODIUM CHLORIDE 9 MG/ML
1000 INJECTION INTRAMUSCULAR; INTRAVENOUS; SUBCUTANEOUS ONCE
Refills: 0 | Status: COMPLETED | OUTPATIENT
Start: 2018-02-17 | End: 2018-02-17

## 2018-02-17 RX ORDER — POTASSIUM CHLORIDE 20 MEQ
40 PACKET (EA) ORAL ONCE
Refills: 0 | Status: COMPLETED | OUTPATIENT
Start: 2018-02-17 | End: 2018-02-17

## 2018-02-17 RX ORDER — OXYCODONE HYDROCHLORIDE 5 MG/1
5 TABLET ORAL ONCE
Refills: 0 | Status: DISCONTINUED | OUTPATIENT
Start: 2018-02-17 | End: 2018-02-17

## 2018-02-17 RX ORDER — OXYCODONE HYDROCHLORIDE 5 MG/1
1 TABLET ORAL
Qty: 12 | Refills: 0
Start: 2018-02-17 | End: 2018-02-19

## 2018-02-17 RX ORDER — TAMSULOSIN HYDROCHLORIDE 0.4 MG/1
1 CAPSULE ORAL
Qty: 30 | Refills: 0
Start: 2018-02-17 | End: 2018-03-18

## 2018-02-17 RX ORDER — MORPHINE SULFATE 50 MG/1
4 CAPSULE, EXTENDED RELEASE ORAL ONCE
Refills: 0 | Status: DISCONTINUED | OUTPATIENT
Start: 2018-02-17 | End: 2018-02-17

## 2018-02-17 RX ADMIN — OXYCODONE HYDROCHLORIDE 5 MILLIGRAM(S): 5 TABLET ORAL at 07:58

## 2018-02-17 RX ADMIN — Medication 40 MILLIEQUIVALENT(S): at 04:22

## 2018-02-17 RX ADMIN — Medication 15 MILLIGRAM(S): at 03:27

## 2018-02-17 RX ADMIN — Medication 15 MILLIGRAM(S): at 03:29

## 2018-02-17 RX ADMIN — ONDANSETRON 4 MILLIGRAM(S): 8 TABLET, FILM COATED ORAL at 05:27

## 2018-02-17 RX ADMIN — MORPHINE SULFATE 4 MILLIGRAM(S): 50 CAPSULE, EXTENDED RELEASE ORAL at 03:29

## 2018-02-17 RX ADMIN — ONDANSETRON 4 MILLIGRAM(S): 8 TABLET, FILM COATED ORAL at 03:27

## 2018-02-17 RX ADMIN — OXYCODONE HYDROCHLORIDE 5 MILLIGRAM(S): 5 TABLET ORAL at 07:57

## 2018-02-17 RX ADMIN — TAMSULOSIN HYDROCHLORIDE 0.4 MILLIGRAM(S): 0.4 CAPSULE ORAL at 07:57

## 2018-02-17 RX ADMIN — SODIUM CHLORIDE 2000 MILLILITER(S): 9 INJECTION INTRAMUSCULAR; INTRAVENOUS; SUBCUTANEOUS at 03:29

## 2018-02-17 RX ADMIN — MORPHINE SULFATE 4 MILLIGRAM(S): 50 CAPSULE, EXTENDED RELEASE ORAL at 03:27

## 2018-02-17 NOTE — ED PROVIDER NOTE - PLAN OF CARE
Follow up with Dr. Vuong in Urology  You may take 600mg of ibuprofen (example: motrin or advil) every 4-6 hours for baseline pain control as indicated with respect to the warnings on the label. This is an over the counter medication.   Take oxycodone as needed, as directed, for breakthrough pain. DO NOT DRINK OR OPERATE HEAVY MACHINERY UNDER THE INFLUENCE OF OXYCODONE. Additionally, take a stool softener while taking this medication.   Take flomax 1x per day until stone passes  Follow up with your primary care doctor within 48-72 hours.   You must return for new, worsening or concerning symptoms; specifically including those listed on the attached sheet.

## 2018-02-17 NOTE — ED PROVIDER NOTE - CARE PLAN
Principal Discharge DX:	Kidney stone  Assessment and plan of treatment:	Follow up with Dr. Vuong in Urology  You may take 600mg of ibuprofen (example: motrin or advil) every 4-6 hours for baseline pain control as indicated with respect to the warnings on the label. This is an over the counter medication.   Take oxycodone as needed, as directed, for breakthrough pain. DO NOT DRINK OR OPERATE HEAVY MACHINERY UNDER THE INFLUENCE OF OXYCODONE. Additionally, take a stool softener while taking this medication.   Take flomax 1x per day until stone passes  Follow up with your primary care doctor within 48-72 hours.   You must return for new, worsening or concerning symptoms; specifically including those listed on the attached sheet.

## 2018-02-17 NOTE — ED PROVIDER NOTE - PHYSICAL EXAMINATION
Edi: A & O x 3, in pain, HEENT WNL and no facial asymmetry; lungs CTAB, heart with reg rhythm; abdomen soft with minimal tenderness in area of pain (rlq), no CVA tenderness; extremities with no edema; skin with no rashes, neuro exam non focal with no motor or sensory deficits

## 2018-02-17 NOTE — ED PROVIDER NOTE - OBJECTIVE STATEMENT
53 year old, new severe right lower quadrant flank pain that woke her from sleep at 1am. history of recent cholecystectomy with [facility] 2 months ago. history of stones on left kidney many years ago. pain is severe and colicky. denies hematuria.

## 2018-02-17 NOTE — ED PROVIDER NOTE - PROGRESS NOTE DETAILS
Edi PGY3: tolerating PO, Informed of ct findings of 4mm stone, will follow with urology, will be discharged with flomax and with oxycodone for pain.

## 2018-02-17 NOTE — ED PROVIDER NOTE - ATTENDING CONTRIBUTION TO CARE
ATTENDING MD:  Rad VASQUEZ, personally have seen and examined this patient.  I have discussed all aspects of care with the resident physician. Resident note reviewed and agree on plan of care and except where noted.  See HPI, PE, and MDM for details.     GEN: uncomfortable, writing, AOx4, moderate-severe disterss from pain and vomiting  HEAD: NCAT  EYES: clear, anicteric  ENT: mucus membranes are moist, oropharynx is within normal limits   CV: normal rate, regular rhythm, S1, S2, 2+ distal pulses  RESP: lungs clear to auscultation bilaterally, equal breath sounds bilaterally  GI: abd soft, nondistended, RLQ tenderness w/out rebound or guarding, not maximal at [place], neg rosving, obturator, psoas  : equivical R CVAT, no L CVAT  SKIN: warm, dry, no rash  NEURO: normal mentation, moving all ext    MDM: severe RLQ pain. DDx includes renal colic vs. acute appendicitis vs. pyelonephritis. labs, imaging, pain control, antiemetics, CT scan.

## 2018-02-17 NOTE — ED ADULT NURSE NOTE - OBJECTIVE STATEMENT
52 yo female complaining of lower right abdominal pain. Patient came guarding location, crying, sweating and states the pain is very severe since 2am. IV placed on left AC 18G, blood collected, MD evaluation, pain medication given. Patient tender on right lower back. Blood pressure elevated, family a the bedside. Heart sounds normal, lungs clear, pt able to ambulate. Urine collected, sent to lab. Will continue to monitor closely.

## 2018-02-17 NOTE — ED PROVIDER NOTE - MEDICAL DECISION MAKING DETAILS
Edi PGY3: abdominal pain with symptoms consistent with stone. Recent history of intraabdominal surgery concerning for post op complication. will provide analgesia, ua. if blood will obtain noncon CT for stone hunt, if normal, will ontain with contrast with priority for post op complication eval but will also see stones if present.

## 2018-02-18 LAB
CULTURE RESULTS: NO GROWTH — SIGNIFICANT CHANGE UP
SPECIMEN SOURCE: SIGNIFICANT CHANGE UP

## 2018-12-09 NOTE — ED ADULT NURSE NOTE - CAS EDN DISCHARGE ASSESSMENT
Telephone Encounter by Catherine Lopes RN, BSN at 04/27/17 09:17 AM     Author:  Catherine Lopes RN, BSN Service:  (none) Author Type:  Registered Nurse     Filed:  04/27/17 09:31 AM Encounter Date:  4/27/2017 Status:  Signed     :  Catherine Lopes RN, BSN (Registered Nurse)            Spoke with Charlene who states:    Organism #1  Wound culture organism Staphylococcus   Coag. Negative staph and feces. Zero sensitivity have been reported.     Charlene faxed over report. Made copy and sent to scanning and placed other copy in[KB1.1M] Dr. Rowland[KB1.1T]'s bin at [facility].     Routing to[KB1.1M] Dr. Rowland[KB1.1T] as MIRYAM[KB1.1M]        Revision History        User Key Date/Time User Provider Type Action    > KB1.1 04/27/17 09:31 AM Catherine Lopes RN, BSN Registered Nurse Sign    M - Manual, T - Template             Alert and oriented to person, place and time

## 2019-10-04 NOTE — ED PROCEDURE NOTE - NS ED PROCEDURE ASSISTED BY
H&P reviewed  After examining the patient I find no changes in the patients condition since the H&P had been written      Vitals:    10/04/19 0918   BP: 140/73   Pulse: 77   Resp: 20   Temp: (!) 97 2 °F (36 2 °C)   SpO2: 94%
The procedure was performed independently

## 2021-09-01 NOTE — ED PROVIDER NOTE - MUSCULOSKELETAL, MLM
Patient's oxygen saturation dropped when transferring to commode to 86% with oxygen @ 4 liters per nasal cannula. Writer increased oxygen to 5 L. Oxygen saturation increased to 91%. Patient verbalizes feeling SOB with transfers. Will continue to monitor oxygen saturation. Spine appears normal, range of motion is not limited, no muscle or joint tenderness

## 2024-03-30 RX ORDER — LOSARTAN POTASSIUM 100 MG/1
1 TABLET, FILM COATED ORAL
Qty: 0 | Refills: 0 | DISCHARGE
